# Patient Record
Sex: MALE | Race: WHITE | NOT HISPANIC OR LATINO
[De-identification: names, ages, dates, MRNs, and addresses within clinical notes are randomized per-mention and may not be internally consistent; named-entity substitution may affect disease eponyms.]

---

## 2019-07-25 PROBLEM — Z00.00 ENCOUNTER FOR PREVENTIVE HEALTH EXAMINATION: Status: ACTIVE | Noted: 2019-07-25

## 2019-10-07 ENCOUNTER — RESULT REVIEW (OUTPATIENT)
Age: 69
End: 2019-10-07

## 2019-10-08 ENCOUNTER — APPOINTMENT (OUTPATIENT)
Dept: DERMATOLOGY | Facility: CLINIC | Age: 69
End: 2019-10-08
Payer: COMMERCIAL

## 2019-10-08 PROCEDURE — 11102 TANGNTL BX SKIN SINGLE LES: CPT

## 2019-10-08 PROCEDURE — 99203 OFFICE O/P NEW LOW 30 MIN: CPT | Mod: 25

## 2019-11-01 ENCOUNTER — APPOINTMENT (OUTPATIENT)
Dept: PULMONOLOGY | Facility: CLINIC | Age: 69
End: 2019-11-01
Payer: COMMERCIAL

## 2019-11-01 VITALS
BODY MASS INDEX: 31.83 KG/M2 | DIASTOLIC BLOOD PRESSURE: 70 MMHG | WEIGHT: 173 LBS | HEIGHT: 62 IN | OXYGEN SATURATION: 93 % | SYSTOLIC BLOOD PRESSURE: 140 MMHG | HEART RATE: 89 BPM

## 2019-11-01 VITALS — OXYGEN SATURATION: 96 %

## 2019-11-01 DIAGNOSIS — Z82.5 FAMILY HISTORY OF ASTHMA AND OTHER CHRONIC LOWER RESPIRATORY DISEASES: ICD-10-CM

## 2019-11-01 DIAGNOSIS — R93.89 ABNORMAL FINDINGS ON DIAGNOSTIC IMAGING OF OTHER SPECIFIED BODY STRUCTURES: ICD-10-CM

## 2019-11-01 DIAGNOSIS — Z87.09 PERSONAL HISTORY OF OTHER DISEASES OF THE RESPIRATORY SYSTEM: ICD-10-CM

## 2019-11-01 DIAGNOSIS — Z86.79 PERSONAL HISTORY OF OTHER DISEASES OF THE CIRCULATORY SYSTEM: ICD-10-CM

## 2019-11-01 DIAGNOSIS — J98.11 ATELECTASIS: ICD-10-CM

## 2019-11-01 PROCEDURE — 94010 BREATHING CAPACITY TEST: CPT | Mod: 59

## 2019-11-01 PROCEDURE — 99204 OFFICE O/P NEW MOD 45 MIN: CPT | Mod: 25

## 2019-11-01 RX ORDER — LOSARTAN POTASSIUM 100 MG/1
TABLET, FILM COATED ORAL
Refills: 0 | Status: ACTIVE | COMMUNITY

## 2019-11-01 RX ORDER — DILTIAZEM HYDROCHLORIDE 90 MG/1
TABLET ORAL
Refills: 0 | Status: ACTIVE | COMMUNITY

## 2019-11-01 RX ORDER — OMEPRAZOLE 20 MG/1
TABLET, DELAYED RELEASE ORAL
Refills: 0 | Status: ACTIVE | COMMUNITY

## 2019-11-01 RX ORDER — TRIAMTERENE AND HYDROCHLOROTHIAZIDE 37.5; 25 MG/1; MG/1
CAPSULE ORAL
Refills: 0 | Status: ACTIVE | COMMUNITY

## 2019-11-01 RX ORDER — ROSUVASTATIN CALCIUM 5 MG/1
TABLET, FILM COATED ORAL
Refills: 0 | Status: ACTIVE | COMMUNITY

## 2019-11-01 NOTE — DISCUSSION/SUMMARY
[FreeTextEntry1] : chronic cough, multifactorial with GERD, post nasal drip syndrome, underlying DEVYN on cpap\par Overall improved, some residual cough, never smoker, hx nasal polyps, no asthma hx, no asa sensitivity \par Lung exam without bronchospasm, normal sp02\par CXR with volume loss/elevated L hemidiaphragm , needs CT to better evaluate lung parenchyma\par Continue HOB elevated, cpap and anti reflux\par Add Astelin nasal spray\par weight loss, vaccinations this fall\par Follows with Cardiology\par 3 months or sooner if needed, will contact with CT scan

## 2019-11-01 NOTE — HISTORY OF PRESENT ILLNESS
[FreeTextEntry1] : cough x years\par tickle in throat, eyes water , nose runs\par day and night\par has decreased with wedge in bed\par has reflux and DEVYN, followed at [facility] sleep\par saw ENT , had some inflammation on cords, per pt bx negative\par never smoker, no hx asthma\par dogs\par no environmental exposures \par works in office\par no chest pain, fever, chill,\par no sig sputum\par no dyspnea\par also  saw allergist, work up negative per pt

## 2019-11-01 NOTE — CONSULT LETTER
[Dear  ___] : Dear  [unfilled], [Consult Letter:] : I had the pleasure of evaluating your patient, [unfilled]. [Please see my note below.] : Please see my note below. [Sincerely,] : Sincerely, [FreeTextEntry3] : [facility]uj Diego [facility]\par Pulmonary Critical Care\par Director Pulmonary Division\par Medical Director Respiratory Therapy\par [facility]\par \par

## 2019-11-21 ENCOUNTER — MESSAGE (OUTPATIENT)
Age: 69
End: 2019-11-21

## 2019-11-25 ENCOUNTER — RX CHANGE (OUTPATIENT)
Age: 69
End: 2019-11-25

## 2019-11-25 RX ORDER — AZELASTINE HYDROCHLORIDE 137 UG/1
137 SPRAY, METERED NASAL DAILY
Qty: 3 | Refills: 3 | Status: ACTIVE | COMMUNITY
Start: 2019-11-01 | End: 1900-01-01

## 2020-02-06 ENCOUNTER — APPOINTMENT (OUTPATIENT)
Dept: PULMONOLOGY | Facility: CLINIC | Age: 70
End: 2020-02-06
Payer: COMMERCIAL

## 2020-02-06 VITALS
HEIGHT: 63 IN | BODY MASS INDEX: 32.25 KG/M2 | RESPIRATION RATE: 14 BRPM | OXYGEN SATURATION: 97 % | SYSTOLIC BLOOD PRESSURE: 130 MMHG | WEIGHT: 182 LBS | DIASTOLIC BLOOD PRESSURE: 84 MMHG | HEART RATE: 75 BPM

## 2020-02-06 DIAGNOSIS — K21.9 GASTRO-ESOPHAGEAL REFLUX DISEASE W/OUT ESOPHAGITIS: ICD-10-CM

## 2020-02-06 DIAGNOSIS — R05 COUGH: ICD-10-CM

## 2020-02-06 PROCEDURE — 99214 OFFICE O/P EST MOD 30 MIN: CPT

## 2020-02-06 NOTE — PROCEDURE
[FreeTextEntry1] : spirometry is normal\par CXR with atelectasis, volume loss L base\par \par CT chest : no sig atelectasis, air fluid level esophagus

## 2020-02-06 NOTE — PHYSICAL EXAM
[General Appearance - Well Developed] : well developed [General Appearance - Well Nourished] : well nourished [Normal Conjunctiva] : the conjunctiva exhibited no abnormalities [Normal Oropharynx] : normal oropharynx [II] : II [Neck Appearance] : the appearance of the neck was normal [Heart Rate And Rhythm] : heart rate and rhythm were normal [Heart Sounds] : normal S1 and S2 [Murmurs] : no murmurs present [Edema] : no peripheral edema present [Respiration, Rhythm And Depth] : normal respiratory rhythm and effort [Exaggerated Use Of Accessory Muscles For Inspiration] : no accessory muscle use [Auscultation Breath Sounds / Voice Sounds] : lungs were clear to auscultation bilaterally [Lungs Percussion] : the lungs were normal to percussion [Abnormal Walk] : normal gait [FreeTextEntry1] : no chest wall abn [Cyanosis, Localized] : no localized cyanosis [Nail Clubbing] : no clubbing of the fingernails [No Focal Deficits] : no focal deficits [] : no rash [Oriented To Time, Place, And Person] : oriented to person, place, and time [Affect] : the affect was normal [Impaired Insight] : insight and judgment were intact [Memory Recent] : recent memory was not impaired

## 2020-02-06 NOTE — HISTORY OF PRESENT ILLNESS
[Never] : never [FreeTextEntry1] : \par  [TextBox_4] : no new pulmonary complaints\par cough improved\par has wedge at night\par has  DEVYN, followed at [facility] sleep\par recent endoscopy, Dr Silva\par saw ENT , had some inflammation on cords, per pt bx negative\par never smoker, no hx asthma\par dogs\par no environmental exposures \par works in office\par no fever, chill, chest pain, no sig dyspnea

## 2020-02-06 NOTE — CONSULT LETTER
[Dear  ___] : Dear  [unfilled], [Consult Letter:] : I had the pleasure of evaluating your patient, [unfilled]. [Please see my note below.] : Please see my note below. [Sincerely,] : Sincerely, [FreeTextEntry3] : [facility]uj [facility] DO [facility]\par Pulmonary Critical Care\par Director Pulmonary Division\par Medical Director Respiratory Therapy\par [facility]\par \par  [DrJuly  ___] : Dr. WINTERS

## 2020-02-06 NOTE — DISCUSSION/SUMMARY
[FreeTextEntry1] : chronic cough, multifactorial with GERD, post nasal drip syndrome, underlying DEVYN on cpap\par improved with GI mgmt\par Overall improved, some residual cough, never smoker, hx nasal polyps, no asthma hx, no asa sensitivity \par Lung exam without bronchospasm, normal sp02\par CT scan without sig pulmonary abn, had air fluid level esophagus, had recent endoscopy, followed by GI\par Continue HOB elevated, cpap and anti reflux\par ENT follow up \par weight loss, \par Follows with Cardiology\par 6-12  months or sooner if needed,

## 2020-02-19 ENCOUNTER — APPOINTMENT (OUTPATIENT)
Dept: DERMATOLOGY | Facility: CLINIC | Age: 70
End: 2020-02-19
Payer: COMMERCIAL

## 2020-02-19 PROCEDURE — 99212 OFFICE O/P EST SF 10 MIN: CPT

## 2021-01-05 ENCOUNTER — APPOINTMENT (OUTPATIENT)
Dept: DERMATOLOGY | Facility: CLINIC | Age: 71
End: 2021-01-05
Payer: COMMERCIAL

## 2021-01-05 PROCEDURE — 99214 OFFICE O/P EST MOD 30 MIN: CPT | Mod: 25

## 2021-01-05 PROCEDURE — 99072 ADDL SUPL MATRL&STAF TM PHE: CPT

## 2021-01-05 PROCEDURE — 10060 I&D ABSCESS SIMPLE/SINGLE: CPT

## 2021-10-07 ENCOUNTER — NON-APPOINTMENT (OUTPATIENT)
Age: 71
End: 2021-10-07

## 2021-11-04 ENCOUNTER — APPOINTMENT (OUTPATIENT)
Dept: GASTROENTEROLOGY | Facility: CLINIC | Age: 71
End: 2021-11-04
Payer: COMMERCIAL

## 2021-11-04 VITALS
HEIGHT: 63 IN | BODY MASS INDEX: 31.18 KG/M2 | SYSTOLIC BLOOD PRESSURE: 163 MMHG | RESPIRATION RATE: 14 BRPM | TEMPERATURE: 98 F | HEART RATE: 68 BPM | WEIGHT: 176 LBS | OXYGEN SATURATION: 96 % | DIASTOLIC BLOOD PRESSURE: 92 MMHG

## 2021-11-04 DIAGNOSIS — K21.9 GASTRO-ESOPHAGEAL REFLUX DISEASE W/OUT ESOPHAGITIS: ICD-10-CM

## 2021-11-04 DIAGNOSIS — R05.3 CHRONIC COUGH: ICD-10-CM

## 2021-11-04 PROCEDURE — 99203 OFFICE O/P NEW LOW 30 MIN: CPT

## 2021-11-04 NOTE — ASSESSMENT
[FreeTextEntry1] : Patient is a 70 y/o male with PMH HTN, HLD, DEVYN, chronic cough, who presents for evaluation and to establish care with [facility]\par \par EGD done in January 2020 with Dr Silva - will request a copy of results\par \par Recommend increasing PPI to Omeprazole 40mg PO QD or 20mg BID to assess for response until he sees Dr. Peguero\par \par Will refer to Dr. Peguero for further evaluation.

## 2021-11-04 NOTE — HISTORY OF PRESENT ILLNESS
[de-identified] : Patient is a 71 year male, with PMH of HTN, HLD and seasonal allergies, who presents for evaluation of chronic cough. \par \par Cough started years ago. Used to have pyrosis/heartburn but no heartburn at this time. He typically feels a tickle in his throat which triggers a coughing episode. \par \par Was seen by ENT who did nasopharyngoscope and told him he has LPR, started on Omeprazole 20mg PO QD. He later saw pulmonology and cough thought to be multifactorial - GERD, post nasal drip, DEVYN. \par \par He saw GI in January 2020, had EGD with Dr. Silva which showed "hiatal hernia, irregular zline, gastritis" but I do not have pathology to review. \par \par Patient denies any significant cardiac conditions. \par \par Patient denies pyrosis, dysphagia, abdominal pain, change in BMs, rectal bleeding, nausea, vomiting, or unexplained weight loss.\par

## 2022-01-19 ENCOUNTER — APPOINTMENT (OUTPATIENT)
Dept: DERMATOLOGY | Facility: CLINIC | Age: 72
End: 2022-01-19
Payer: COMMERCIAL

## 2022-01-19 PROCEDURE — 99213 OFFICE O/P EST LOW 20 MIN: CPT

## 2022-02-03 ENCOUNTER — TRANSCRIPTION ENCOUNTER (OUTPATIENT)
Age: 72
End: 2022-02-03

## 2022-02-04 ENCOUNTER — OUTPATIENT (OUTPATIENT)
Dept: OUTPATIENT SERVICES | Facility: HOSPITAL | Age: 72
LOS: 1 days | End: 2022-02-04
Payer: COMMERCIAL

## 2022-02-04 DIAGNOSIS — K21.9 GASTRO-ESOPHAGEAL REFLUX DISEASE WITHOUT ESOPHAGITIS: ICD-10-CM

## 2022-02-04 LAB — GLUCOSE BLDC GLUCOMTR-MCNC: 103 MG/DL — HIGH (ref 70–99)

## 2022-02-04 PROCEDURE — 91035 G-ESOPH REFLX TST W/ELECTROD: CPT

## 2022-02-04 PROCEDURE — C1889: CPT

## 2022-02-04 PROCEDURE — 82962 GLUCOSE BLOOD TEST: CPT

## 2022-02-04 DEVICE — IMPLANTABLE DEVICE: Type: IMPLANTABLE DEVICE | Status: FUNCTIONAL

## 2022-12-01 ENCOUNTER — APPOINTMENT (OUTPATIENT)
Dept: ORTHOPEDIC SURGERY | Facility: CLINIC | Age: 72
End: 2022-12-01

## 2022-12-01 ENCOUNTER — APPOINTMENT (OUTPATIENT)
Dept: ORTHOPEDIC SURGERY | Facility: CLINIC | Age: 72
End: 2022-12-01
Payer: COMMERCIAL

## 2022-12-01 VITALS
DIASTOLIC BLOOD PRESSURE: 83 MMHG | HEIGHT: 63 IN | SYSTOLIC BLOOD PRESSURE: 169 MMHG | BODY MASS INDEX: 31.18 KG/M2 | WEIGHT: 176 LBS | HEART RATE: 83 BPM

## 2022-12-01 DIAGNOSIS — M47.816 SPONDYLOSIS W/OUT MYELOPATHY OR RADICULOPATHY, LUMBAR REGION: ICD-10-CM

## 2022-12-01 DIAGNOSIS — M54.50 LOW BACK PAIN, UNSPECIFIED: ICD-10-CM

## 2022-12-01 PROCEDURE — 99203 OFFICE O/P NEW LOW 30 MIN: CPT

## 2022-12-01 PROCEDURE — 99213 OFFICE O/P EST LOW 20 MIN: CPT

## 2022-12-01 PROCEDURE — 72100 X-RAY EXAM L-S SPINE 2/3 VWS: CPT

## 2022-12-01 RX ORDER — MELOXICAM 15 MG/1
15 TABLET ORAL
Qty: 30 | Refills: 1 | Status: ACTIVE | COMMUNITY
Start: 2022-12-01 | End: 1900-01-01

## 2022-12-01 NOTE — DISCUSSION/SUMMARY
[de-identified] : \par 30 minutes was spent reviewing the x-rays as well as discussing with the patient their clinical presentation, diagnosis and providing education. The patient was educated on the degenerative change of the lumbar spine. At this time he does not have any specific hip complaints. Normal range of motion of the hips was found without pain provocation. The pain is likely or draining from his lower lumbar spine. At this time he is recommended a course of physical therapy. He will also begin use of meloxicam as needed for pain control. He will follow-up with physiatry in approximately 6 weeks if he is not clinically improved. If he should have any acute changes he will contact the office. He will continue with activities as tolerated. \par \par \par

## 2022-12-01 NOTE — PHYSICAL EXAM
[de-identified] : The patient is conversive and in no apparent distress. The patient is alert and oriented to person, place, and time. Affect and mood appear normal. The head is normocephalic and atraumatic. Skin shows normal turgor with no evidence of eczema or psoriasis. No respiratory distress noted. Sensation grossly intact. MUSCULOSKELETAL: SEE BELOW\par \par Lumbar spine exam demonstrates no midline tenderness. Minimal tenderness of the lower paravertebral lumbar spine bilateral sides. Normal range of motion of the hips. No hip stiffness or pain provocation is appreciated. Normal sensation to light touch distally. No sensory dysfunction of the lateral hip area. 5/5 motor function of the hips, knees and ankles. No calf tenderness. \par \par  [de-identified] : Imaging: Single AP view of the pelvis demonstrates minimal degenerative changes and joint space narrowing of the hips. No lytic lesions. No fractures. No retained hardware.\par \par 2 view lumbar spine x-ray demonstrates spondylosis and spondylolisthesis of L4-L5.

## 2022-12-01 NOTE — HISTORY OF PRESENT ILLNESS
[de-identified] : Patient presents today for evaluation of lower back pain. Has had the pain for about 1 year now. It is intermittent in presentation. He reports of the lower lumbar back pain with some radicular symptoms to both lateral hip areas. He denies of any distal sensory changes. No foot weaknesses. He denies of any specific trauma. He occasionally takes Aleve in the morning and has good relief. He is not having orthopedic care for this complaint. He is not using a cane or walker. He is not tripping over his feet. He has not had any bowel or bladder dysfunction.\par \par Review of Systems-\par Constitutional: No fever or chills. \par Cardiovascular: No orthopnea or chest pain\par Pulmonary: No shortness of breath. \par GI: No nausea or vomiting or abdominal pain.\par Musculoskeletal: see HPI \par Psychiatric: No anxiety and depression. \par \par

## 2022-12-03 PROBLEM — M47.816 LUMBAR SPONDYLOSIS: Status: ACTIVE | Noted: 2022-12-01

## 2022-12-03 PROBLEM — M54.50 LUMBAR BACK PAIN: Status: ACTIVE | Noted: 2022-12-01

## 2023-01-18 ENCOUNTER — APPOINTMENT (OUTPATIENT)
Dept: DERMATOLOGY | Facility: CLINIC | Age: 73
End: 2023-01-18
Payer: COMMERCIAL

## 2023-01-18 PROCEDURE — 99213 OFFICE O/P EST LOW 20 MIN: CPT

## 2023-03-29 ENCOUNTER — OFFICE (OUTPATIENT)
Dept: URBAN - METROPOLITAN AREA CLINIC 115 | Facility: CLINIC | Age: 73
Setting detail: OPHTHALMOLOGY
End: 2023-03-29
Payer: COMMERCIAL

## 2023-03-29 DIAGNOSIS — H52.4: ICD-10-CM

## 2023-03-29 DIAGNOSIS — E11.9: ICD-10-CM

## 2023-03-29 DIAGNOSIS — H25.13: ICD-10-CM

## 2023-03-29 DIAGNOSIS — H35.033: ICD-10-CM

## 2023-03-29 PROCEDURE — 92004 COMPRE OPH EXAM NEW PT 1/>: CPT | Performed by: OPTOMETRIST

## 2023-03-29 PROCEDURE — 92250 FUNDUS PHOTOGRAPHY W/I&R: CPT | Performed by: OPTOMETRIST

## 2023-03-29 PROCEDURE — 92015 DETERMINE REFRACTIVE STATE: CPT | Performed by: OPTOMETRIST

## 2023-03-29 ASSESSMENT — SPHEQUIV_DERIVED
OS_SPHEQUIV: 1.625
OS_SPHEQUIV: 1.625
OD_SPHEQUIV: 1.375
OD_SPHEQUIV: 0.875

## 2023-03-29 ASSESSMENT — REFRACTION_MANIFEST
OS_ADD: +3.00
OD_AXIS: 100
OS_SPHERE: +2.75
OD_ADD: +3.00
OD_CYLINDER: -2.25
OD_VA1: 20/20
OS_CYLINDER: -2.25
OS_AXIS: 160
OD_SPHERE: +2.50
OS_VA1: 20/20

## 2023-03-29 ASSESSMENT — REFRACTION_CURRENTRX
OD_SPHERE: +2.50
OD_AXIS: 090
OS_CYLINDER: -1.75
OS_OVR_VA: 20/
OD_CYLINDER: -1.75
OS_SPHERE: +2.50
OD_ADD: +2.50
OS_AXIS: 077
OS_ADD: +2.50
OD_OVR_VA: 20/

## 2023-03-29 ASSESSMENT — REFRACTION_AUTOREFRACTION
OS_CYLINDER: -2.25
OS_AXIS: 158
OD_CYLINDER: -2.25
OD_AXIS: 099
OS_SPHERE: +2.75
OD_SPHERE: +2.00

## 2023-03-29 ASSESSMENT — CONFRONTATIONAL VISUAL FIELD TEST (CVF)
OS_FINDINGS: FULL
OD_FINDINGS: FULL

## 2023-03-29 ASSESSMENT — VISUAL ACUITY
OD_BCVA: 20/25
OS_BCVA: 20/25-1

## 2023-03-29 ASSESSMENT — TONOMETRY
OD_IOP_MMHG: 16
OS_IOP_MMHG: 16

## 2023-10-25 ENCOUNTER — OFFICE (OUTPATIENT)
Dept: URBAN - METROPOLITAN AREA CLINIC 115 | Facility: CLINIC | Age: 73
Setting detail: OPHTHALMOLOGY
End: 2023-10-25
Payer: COMMERCIAL

## 2023-10-25 DIAGNOSIS — E11.9: ICD-10-CM

## 2023-10-25 DIAGNOSIS — H25.13: ICD-10-CM

## 2023-10-25 DIAGNOSIS — H35.033: ICD-10-CM

## 2023-10-25 PROCEDURE — 92014 COMPRE OPH EXAM EST PT 1/>: CPT | Performed by: OPTOMETRIST

## 2023-10-25 PROCEDURE — 92250 FUNDUS PHOTOGRAPHY W/I&R: CPT | Performed by: OPTOMETRIST

## 2023-10-25 ASSESSMENT — REFRACTION_CURRENTRX
OS_SPHERE: +2.50
OS_AXIS: 077
OD_AXIS: 090
OD_SPHERE: +2.50
OS_ADD: +2.50
OS_CYLINDER: -1.75
OS_OVR_VA: 20/
OD_ADD: +2.50
OD_OVR_VA: 20/
OD_CYLINDER: -1.75

## 2023-10-25 ASSESSMENT — REFRACTION_MANIFEST
OS_ADD: +3.00
OD_AXIS: 100
OD_CYLINDER: -2.25
OS_CYLINDER: -2.25
OS_SPHERE: +2.75
OD_ADD: +3.00
OD_VA1: 20/20
OS_AXIS: 160
OD_SPHERE: +2.50
OS_VA1: 20/20

## 2023-10-25 ASSESSMENT — TONOMETRY
OD_IOP_MMHG: 15
OS_IOP_MMHG: 16

## 2023-10-25 ASSESSMENT — REFRACTION_AUTOREFRACTION
OS_AXIS: 089
OS_SPHERE: +2.50
OD_SPHERE: +2.25
OD_CYLINDER: -1.75
OS_CYLINDER: -2.00
OD_AXIS: 102

## 2023-10-25 ASSESSMENT — SPHEQUIV_DERIVED
OS_SPHEQUIV: 1.5
OD_SPHEQUIV: 1.375
OD_SPHEQUIV: 1.375
OS_SPHEQUIV: 1.625

## 2023-10-25 ASSESSMENT — CONFRONTATIONAL VISUAL FIELD TEST (CVF)
OS_FINDINGS: FULL
OD_FINDINGS: FULL

## 2023-10-25 ASSESSMENT — VISUAL ACUITY
OD_BCVA: 20/20
OS_BCVA: 20/25

## 2024-01-13 ENCOUNTER — NON-APPOINTMENT (OUTPATIENT)
Age: 74
End: 2024-01-13

## 2024-01-18 ENCOUNTER — APPOINTMENT (OUTPATIENT)
Dept: DERMATOLOGY | Facility: CLINIC | Age: 74
End: 2024-01-18
Payer: COMMERCIAL

## 2024-01-18 PROCEDURE — 99213 OFFICE O/P EST LOW 20 MIN: CPT

## 2024-04-25 ENCOUNTER — OFFICE (OUTPATIENT)
Dept: URBAN - METROPOLITAN AREA CLINIC 115 | Facility: CLINIC | Age: 74
Setting detail: OPHTHALMOLOGY
End: 2024-04-25
Payer: COMMERCIAL

## 2024-04-25 DIAGNOSIS — H35.033: ICD-10-CM

## 2024-04-25 DIAGNOSIS — H25.13: ICD-10-CM

## 2024-04-25 PROCEDURE — 92014 COMPRE OPH EXAM EST PT 1/>: CPT | Performed by: OPTOMETRIST

## 2024-04-25 PROCEDURE — 92250 FUNDUS PHOTOGRAPHY W/I&R: CPT | Performed by: OPTOMETRIST

## 2024-10-24 ENCOUNTER — OFFICE (OUTPATIENT)
Dept: URBAN - METROPOLITAN AREA CLINIC 115 | Facility: CLINIC | Age: 74
Setting detail: OPHTHALMOLOGY
End: 2024-10-24
Payer: COMMERCIAL

## 2024-10-24 DIAGNOSIS — H25.13: ICD-10-CM

## 2024-10-24 DIAGNOSIS — H35.033: ICD-10-CM

## 2024-10-24 PROCEDURE — 92250 FUNDUS PHOTOGRAPHY W/I&R: CPT | Performed by: OPTOMETRIST

## 2024-10-24 PROCEDURE — 92014 COMPRE OPH EXAM EST PT 1/>: CPT | Performed by: OPTOMETRIST

## 2024-10-24 ASSESSMENT — REFRACTION_MANIFEST
OS_VA1: 20/20
OD_CYLINDER: -2.25
OS_AXIS: 160
OD_ADD: +3.00
OS_CYLINDER: -2.25
OD_SPHERE: +2.50
OD_VA1: 20/20
OS_SPHERE: +2.75
OD_AXIS: 100
OS_ADD: +3.00

## 2024-10-24 ASSESSMENT — REFRACTION_CURRENTRX
OS_SPHERE: +2.50
OS_CYLINDER: -1.75
OS_AXIS: 077
OD_AXIS: 090
OS_ADD: +2.50
OD_OVR_VA: 20/
OS_OVR_VA: 20/
OD_ADD: +2.50
OD_SPHERE: +2.50
OD_CYLINDER: -1.75

## 2024-10-24 ASSESSMENT — CONFRONTATIONAL VISUAL FIELD TEST (CVF)
OS_FINDINGS: FULL
OD_FINDINGS: FULL

## 2024-10-24 ASSESSMENT — REFRACTION_AUTOREFRACTION
OS_CYLINDER: -2.25
OS_SPHERE: +2.50
OD_CYLINDER: -2.00
OD_AXIS: 099
OD_SPHERE: +2.25
OS_AXIS: 087

## 2024-10-24 ASSESSMENT — VISUAL ACUITY
OD_BCVA: 20/20-1
OS_BCVA: 20/20-1

## 2024-10-24 ASSESSMENT — TONOMETRY
OD_IOP_MMHG: 14
OS_IOP_MMHG: 14

## 2025-01-27 ENCOUNTER — APPOINTMENT (OUTPATIENT)
Dept: DERMATOLOGY | Facility: CLINIC | Age: 75
End: 2025-01-27
Payer: COMMERCIAL

## 2025-01-27 PROCEDURE — 11102 TANGNTL BX SKIN SINGLE LES: CPT

## 2025-01-27 PROCEDURE — 99213 OFFICE O/P EST LOW 20 MIN: CPT | Mod: 25

## 2025-02-06 ENCOUNTER — NON-APPOINTMENT (OUTPATIENT)
Age: 75
End: 2025-02-06

## 2025-02-07 ENCOUNTER — APPOINTMENT (OUTPATIENT)
Dept: DERMATOLOGY | Facility: CLINIC | Age: 75
End: 2025-02-07
Payer: COMMERCIAL

## 2025-02-07 DIAGNOSIS — C44.319 BASAL CELL CARCINOMA OF SKIN OF OTHER PARTS OF FACE: ICD-10-CM

## 2025-02-07 PROCEDURE — 99204 OFFICE O/P NEW MOD 45 MIN: CPT

## 2025-02-07 PROCEDURE — 99214 OFFICE O/P EST MOD 30 MIN: CPT

## 2025-02-28 ENCOUNTER — APPOINTMENT (OUTPATIENT)
Dept: DERMATOLOGY | Facility: CLINIC | Age: 75
End: 2025-02-28
Payer: COMMERCIAL

## 2025-02-28 ENCOUNTER — NON-APPOINTMENT (OUTPATIENT)
Age: 75
End: 2025-02-28

## 2025-02-28 DIAGNOSIS — C44.319 BASAL CELL CARCINOMA OF SKIN OF OTHER PARTS OF FACE: ICD-10-CM

## 2025-02-28 PROCEDURE — 12052 INTMD RPR FACE/MM 2.6-5.0 CM: CPT

## 2025-02-28 PROCEDURE — 17311 MOHS 1 STAGE H/N/HF/G: CPT

## 2025-02-28 RX ORDER — MUPIROCIN 20 MG/G
2 OINTMENT TOPICAL
Qty: 22 | Refills: 1 | Status: ACTIVE | COMMUNITY
Start: 2025-02-28 | End: 1900-01-01

## 2025-02-28 RX ORDER — MUPIROCIN 20 MG/G
2 OINTMENT TOPICAL TWICE DAILY
Qty: 1 | Refills: 6 | Status: ACTIVE | COMMUNITY
Start: 2025-02-28 | End: 1900-01-01

## 2025-04-24 ENCOUNTER — OFFICE (OUTPATIENT)
Dept: URBAN - METROPOLITAN AREA CLINIC 115 | Facility: CLINIC | Age: 75
Setting detail: OPHTHALMOLOGY
End: 2025-04-24
Payer: COMMERCIAL

## 2025-04-24 DIAGNOSIS — H25.13: ICD-10-CM

## 2025-04-24 DIAGNOSIS — H52.4: ICD-10-CM

## 2025-04-24 DIAGNOSIS — H35.033: ICD-10-CM

## 2025-04-24 PROCEDURE — 92250 FUNDUS PHOTOGRAPHY W/I&R: CPT | Performed by: OPTOMETRIST

## 2025-04-24 PROCEDURE — 92014 COMPRE OPH EXAM EST PT 1/>: CPT | Performed by: OPTOMETRIST

## 2025-04-24 PROCEDURE — 92015 DETERMINE REFRACTIVE STATE: CPT | Performed by: OPTOMETRIST

## 2025-04-24 ASSESSMENT — REFRACTION_MANIFEST
OS_AXIS: 088
OD_AXIS: 100
OD_SPHERE: +2.50
OD_SPHERE: +2.00
OS_SPHERE: +2.25
OD_CYLINDER: -2.25
OD_ADD: +3.00
OS_VA1: 20/20
OS_CYLINDER: -2.00
OD_VA1: 20/20
OS_ADD: +3.00
OS_VA1: 20/20
OS_ADD: +3.00
OS_SPHERE: +2.75
OS_CYLINDER: -2.25
OS_AXIS: 160
OD_AXIS: 099
OD_VA1: 20/20
OD_CYLINDER: -2.25
OD_ADD: +3.00

## 2025-04-24 ASSESSMENT — REFRACTION_AUTOREFRACTION
OS_CYLINDER: -2.00
OS_SPHERE: +2.25
OD_SPHERE: +2.00
OD_CYLINDER: -2.25
OS_AXIS: 088
OD_AXIS: 099

## 2025-04-24 ASSESSMENT — CONFRONTATIONAL VISUAL FIELD TEST (CVF)
OD_FINDINGS: FULL
OS_FINDINGS: FULL

## 2025-04-24 ASSESSMENT — REFRACTION_CURRENTRX
OS_OVR_VA: 20/
OS_ADD: +2.50
OD_SPHERE: +2.50
OS_AXIS: 077
OS_CYLINDER: -1.75
OS_SPHERE: +2.50
OD_AXIS: 090
OD_ADD: +2.50
OD_OVR_VA: 20/
OD_CYLINDER: -1.75

## 2025-04-24 ASSESSMENT — VISUAL ACUITY
OS_BCVA: 20/30-1
OD_BCVA: 20/20

## 2025-04-24 ASSESSMENT — TONOMETRY
OD_IOP_MMHG: 13
OS_IOP_MMHG: 16

## 2025-08-27 ENCOUNTER — NON-APPOINTMENT (OUTPATIENT)
Age: 75
End: 2025-08-27

## 2025-08-28 ENCOUNTER — APPOINTMENT (OUTPATIENT)
Dept: DERMATOLOGY | Facility: CLINIC | Age: 75
End: 2025-08-28
Payer: COMMERCIAL

## 2025-08-28 PROCEDURE — 99213 OFFICE O/P EST LOW 20 MIN: CPT

## (undated) DEVICE — VALVE ENDOSCOPE DEFENDO SINGLE USE